# Patient Record
Sex: MALE | Race: WHITE | NOT HISPANIC OR LATINO | Employment: PART TIME | URBAN - METROPOLITAN AREA
[De-identification: names, ages, dates, MRNs, and addresses within clinical notes are randomized per-mention and may not be internally consistent; named-entity substitution may affect disease eponyms.]

---

## 2017-02-15 ENCOUNTER — HOSPITAL ENCOUNTER (OUTPATIENT)
Dept: OTHER | Facility: HOSPITAL | Age: 57
Setting detail: SPECIMEN
Discharge: HOME | End: 2017-02-15
Attending: UROLOGY | Admitting: UROLOGY

## 2017-02-15 LAB — SPECIMEN SOURCE: NORMAL

## 2021-04-22 ENCOUNTER — DOCUMENTATION (OUTPATIENT)
Dept: SURGERY | Facility: CLINIC | Age: 61
End: 2021-04-22

## 2021-04-22 NOTE — PROGRESS NOTES
5 yr cy, last done 07/20/2016.     04/23/2021, mailed colonoscopy recall letter to patient.     Thank You.

## 2021-07-22 ENCOUNTER — PREP FOR SURGERY (OUTPATIENT)
Dept: OTHER | Facility: HOSPITAL | Age: 61
End: 2021-07-22

## 2021-07-22 DIAGNOSIS — Z80.0 ENCOUNTER FOR COLONOSCOPY IN PATIENT WITH FAMILY HISTORY OF COLON CANCER: Primary | ICD-10-CM

## 2021-07-22 DIAGNOSIS — Z12.11 ENCOUNTER FOR COLONOSCOPY IN PATIENT WITH FAMILY HISTORY OF COLON CANCER: Primary | ICD-10-CM

## 2021-07-23 PROBLEM — Z80.0 ENCOUNTER FOR COLONOSCOPY IN PATIENT WITH FAMILY HISTORY OF COLON CANCER: Status: ACTIVE | Noted: 2021-07-23

## 2021-07-23 PROBLEM — Z12.11 ENCOUNTER FOR COLONOSCOPY IN PATIENT WITH FAMILY HISTORY OF COLON CANCER: Status: ACTIVE | Noted: 2021-07-23

## 2021-11-17 ENCOUNTER — ANESTHESIA EVENT (OUTPATIENT)
Dept: GASTROENTEROLOGY | Facility: HOSPITAL | Age: 61
End: 2021-11-17

## 2021-11-17 ENCOUNTER — ANESTHESIA (OUTPATIENT)
Dept: GASTROENTEROLOGY | Facility: HOSPITAL | Age: 61
End: 2021-11-17

## 2021-11-17 ENCOUNTER — HOSPITAL ENCOUNTER (OUTPATIENT)
Facility: HOSPITAL | Age: 61
Setting detail: HOSPITAL OUTPATIENT SURGERY
Discharge: HOME | End: 2021-11-17
Attending: COLON & RECTAL SURGERY | Admitting: COLON & RECTAL SURGERY

## 2021-11-17 VITALS
HEIGHT: 78 IN | RESPIRATION RATE: 16 BRPM | SYSTOLIC BLOOD PRESSURE: 135 MMHG | BODY MASS INDEX: 36.45 KG/M2 | TEMPERATURE: 98.3 F | HEART RATE: 78 BPM | WEIGHT: 315 LBS | OXYGEN SATURATION: 98 % | DIASTOLIC BLOOD PRESSURE: 86 MMHG

## 2021-11-17 LAB — GLUCOSE BLDC GLUCOMTR-MCNC: 205 MG/DL (ref 70–130)

## 2021-11-17 PROCEDURE — 25010000002 PROPOFOL 10 MG/ML EMULSION: Performed by: ANESTHESIOLOGY

## 2021-11-17 PROCEDURE — 82962 GLUCOSE BLOOD TEST: CPT

## 2021-11-17 PROCEDURE — 45378 DIAGNOSTIC COLONOSCOPY: CPT | Performed by: COLON & RECTAL SURGERY

## 2021-11-17 RX ORDER — ACETAMINOPHEN 500 MG
500 TABLET ORAL EVERY 6 HOURS PRN
COMMUNITY

## 2021-11-17 RX ORDER — SODIUM CHLORIDE, SODIUM LACTATE, POTASSIUM CHLORIDE, CALCIUM CHLORIDE 600; 310; 30; 20 MG/100ML; MG/100ML; MG/100ML; MG/100ML
INJECTION, SOLUTION INTRAVENOUS CONTINUOUS PRN
Status: DISCONTINUED | OUTPATIENT
Start: 2021-11-17 | End: 2021-11-17 | Stop reason: SURG

## 2021-11-17 RX ORDER — LIDOCAINE HYDROCHLORIDE 20 MG/ML
INJECTION, SOLUTION INFILTRATION; PERINEURAL AS NEEDED
Status: DISCONTINUED | OUTPATIENT
Start: 2021-11-17 | End: 2021-11-17 | Stop reason: SURG

## 2021-11-17 RX ORDER — SODIUM CHLORIDE, SODIUM LACTATE, POTASSIUM CHLORIDE, CALCIUM CHLORIDE 600; 310; 30; 20 MG/100ML; MG/100ML; MG/100ML; MG/100ML
30 INJECTION, SOLUTION INTRAVENOUS CONTINUOUS PRN
Status: DISCONTINUED | OUTPATIENT
Start: 2021-11-17 | End: 2021-11-17 | Stop reason: HOSPADM

## 2021-11-17 RX ORDER — PROPOFOL 10 MG/ML
VIAL (ML) INTRAVENOUS CONTINUOUS PRN
Status: DISCONTINUED | OUTPATIENT
Start: 2021-11-17 | End: 2021-11-17 | Stop reason: SURG

## 2021-11-17 RX ORDER — PROPOFOL 10 MG/ML
VIAL (ML) INTRAVENOUS AS NEEDED
Status: DISCONTINUED | OUTPATIENT
Start: 2021-11-17 | End: 2021-11-17 | Stop reason: SURG

## 2021-11-17 RX ADMIN — PROPOFOL 150 MG: 10 INJECTION, EMULSION INTRAVENOUS at 08:12

## 2021-11-17 RX ADMIN — SODIUM CHLORIDE, POTASSIUM CHLORIDE, SODIUM LACTATE AND CALCIUM CHLORIDE 30 ML/HR: 600; 310; 30; 20 INJECTION, SOLUTION INTRAVENOUS at 07:50

## 2021-11-17 RX ADMIN — SODIUM CHLORIDE, POTASSIUM CHLORIDE, SODIUM LACTATE AND CALCIUM CHLORIDE: 600; 310; 30; 20 INJECTION, SOLUTION INTRAVENOUS at 08:10

## 2021-11-17 RX ADMIN — LIDOCAINE HYDROCHLORIDE 100 MG: 20 INJECTION, SOLUTION INFILTRATION; PERINEURAL at 08:11

## 2021-11-17 RX ADMIN — Medication 100 MCG/KG/MIN: at 08:12

## 2021-11-17 NOTE — DISCHARGE INSTRUCTIONS
For the next 24 hours patient needs to be with a responsible adult.    For 24 hours DO NOT drive, operate machinery, appliances, drink alcohol, make important decisions or sign legal documents.    Start with a light or bland diet if you are feeling sick to your stomach otherwise advance to regular diet as tolerated.    Follow recommendations on procedure report if provided by your doctor.    Call Dr Bradford for problems [phone]    Problems may include but not limited to: large amounts of bleeding, trouble breathing, repeated vomiting, severe unrelieved pain, fever or chills.

## 2021-11-17 NOTE — H&P
Zak Pérez is a 61 y.o. male  who is referred by Brenton Bradford MD for a colonoscopy. He   has an indications: family history of colon polyps.     He denies any change in bowel function, melena, or hematochezia.    Past Medical History:   Diagnosis Date   • Atrial fibrillation (HCC)    • Diabetes mellitus (HCC)    • Diverticula, colon 07/20/2016    Colonoscopy; Few small & large-mouthed diverticula in sigmoid colon, Dr. Brenton Bradford at [facility].   • Hypertension        Past Surgical History:   Procedure Laterality Date   • CARDIOVERSION  2017   • COLONOSCOPY N/A 7/20/2016    Few small & large-mouthed diverticula in sigmoid colon, rescope 5 years, Dr. Brenton Bradford at [facility].  Surgeon: Brenton Bradford MD;  Location: [facility] ENDOSCOPY;  Service:    • FOOT SURGERY     • FOOT SURGERY Right        Medications Prior to Admission   Medication Sig Dispense Refill Last Dose   • acetaminophen (TYLENOL) 500 MG tablet Take 500 mg by mouth Every 6 (Six) Hours As Needed for Mild Pain .   11/16/2021 at Unknown time   • apixaban (ELIQUIS) 5 MG tablet tablet Take 5 mg by mouth 2 (Two) Times a Day.   11/10/2021   • flecainide (TAMBOCOR) 150 MG tablet Take 150 mg by mouth 2 (two) times a day.   11/17/2021 at Unknown time   • losartan-hydrochlorothiazide (HYZAAR) 100-25 MG per tablet Take 1 tablet by mouth daily.   11/16/2021 at Unknown time   • metFORMIN (GLUCOPHAGE) 1000 MG tablet Take 1,000 mg by mouth 2 (two) times a day with meals.   11/16/2021 at Unknown time   • metoprolol tartrate (LOPRESSOR) 50 MG tablet Take 50 mg by mouth 2 (two) times a day.   11/17/2021 at Unknown time   • potassium chloride (K-DUR) 10 MEQ CR tablet Take 10 mEq by mouth 2 (two) times a day.   11/16/2021 at Unknown time       No Known Allergies    Family History   Problem Relation Age of Onset   • Colon polyps Mother    • Diabetes Mother    • Colon polyps Father    • Cancer Father         COLON   • Cancer Brother         COLON   • Colon polyps Brother    •  Diabetes Brother    • Malig Hyperthermia Neg Hx        Social History     Socioeconomic History   • Marital status: Single   Tobacco Use   • Smoking status: Former Smoker   • Smokeless tobacco: Never Used   Vaping Use   • Vaping Use: Never used   Substance and Sexual Activity   • Alcohol use: Yes     Comment: RARELY   • Drug use: No   • Sexual activity: Defer       Review of Systems   Gastrointestinal: Negative for abdominal pain, nausea and vomiting.   All other systems reviewed and are negative.      Vitals:    11/17/21 0745   BP: 148/83   Pulse: 83   Resp: 16   Temp: 98.3 °F (36.8 °C)   SpO2: 97%         Physical Exam  Constitutional:       Appearance: He is well-developed.   HENT:      Head: Normocephalic and atraumatic.   Eyes:      Pupils: Pupils are equal, round, and reactive to light.   Cardiovascular:      Rate and Rhythm: Regular rhythm.   Pulmonary:      Effort: Pulmonary effort is normal.   Abdominal:      General: There is no distension.      Palpations: Abdomen is soft.   Musculoskeletal:         General: Normal range of motion.   Skin:     General: Skin is warm and dry.   Neurological:      Mental Status: He is alert and oriented to person, place, and time.   Psychiatric:         Thought Content: Thought content normal.         Judgment: Judgment normal.           Assessment/Plan      indications: family history of colon polyps         I recommend colonoscopy.  I described risks, benefits of the procedure with the patient including but not limited to bleeding, infection, possibility of perforation and possible polypectomy. All of the patient's questions were answered and they would like to proceed with the above recommendations.

## 2021-11-17 NOTE — ANESTHESIA POSTPROCEDURE EVALUATION
"Patient: Zak Pérez    Procedure Summary     Date: 11/17/21 Room / Location: [facility] ENDOSCOPY 9 / [facility] ENDOSCOPY    Anesthesia Start: 0809 Anesthesia Stop: 0841    Procedure: COLONOSCOPY to cecum (N/A ) Diagnosis:       Encounter for colonoscopy in patient with family history of colon cancer      (Encounter for colonoscopy in patient with family history of colon cancer [Z12.11, Z80.0])    Surgeons: Brenton Bradford MD Provider: Salvatore Rodriguez MD    Anesthesia Type: MAC ASA Status: 3          Anesthesia Type: MAC    Vitals  Vitals Value Taken Time   /86 11/17/21 0900   Temp     Pulse 78 11/17/21 0900   Resp 16 11/17/21 0900   SpO2 98 % 11/17/21 0900           Post Anesthesia Care and Evaluation    Patient location during evaluation: bedside  Patient participation: complete - patient participated  Level of consciousness: sleepy but conscious  Pain score: 0  Pain management: adequate  Airway patency: patent  Anesthetic complications: No anesthetic complications    Cardiovascular status: acceptable  Respiratory status: acceptable  Hydration status: acceptable    Comments: /86   Pulse 78   Temp 36.8 °C (98.3 °F) (Oral)   Resp 16   Ht 198.1 cm (78\")   Wt (!) 161 kg (354 lb)   SpO2 98%   BMI 40.91 kg/m²         "

## 2021-11-17 NOTE — ANESTHESIA PREPROCEDURE EVALUATION
Anesthesia Evaluation     Patient summary reviewed and Nursing notes reviewed   NPO Solid Status: > 8 hours  NPO Liquid Status: > 8 hours           Airway   Mallampati: II  Neck ROM: full  No difficulty expected  Dental - normal exam     Pulmonary     breath sounds clear to auscultation  (+) a smoker Former,   Cardiovascular     Rhythm: regular    (+) hypertension, dysrhythmias Atrial Fib,       Neuro/Psych  GI/Hepatic/Renal/Endo    (+) obesity (350 lbs), morbid obesity,  diabetes mellitus,     Musculoskeletal     Abdominal   (+) obese,    Substance History      OB/GYN          Other                        Anesthesia Plan    ASA 3     MAC     intravenous induction     Anesthetic plan, all risks, benefits, and alternatives have been provided, discussed and informed consent has been obtained with: patient.

## 2021-11-23 ENCOUNTER — IMMUNIZATION (OUTPATIENT)
Dept: VACCINE CLINIC | Facility: HOSPITAL | Age: 61
End: 2021-11-23

## 2021-11-23 PROCEDURE — 91300 HC SARSCOV02 VAC 30MCG/0.3ML IM: CPT | Performed by: INTERNAL MEDICINE

## 2021-11-23 PROCEDURE — 0004A ADM SARSCOV2 30MCG/0.3ML BOOSTER: CPT | Performed by: INTERNAL MEDICINE

## (undated) DEVICE — TUBING, SUCTION, 1/4" X 10', STRAIGHT: Brand: MEDLINE

## (undated) DEVICE — KT ORCA ORCAPOD DISP STRL

## (undated) DEVICE — ADAPT CLN BIOGUARD AIR/H2O DISP

## (undated) DEVICE — CANN O2 ETCO2 FITS ALL CONN CO2 SMPL A/ 7IN DISP LF

## (undated) DEVICE — GOWN SURG AERO CHROME XL

## (undated) DEVICE — SENSR O2 OXIMAX FNGR A/ 18IN NONSTR

## (undated) DEVICE — LN SMPL CO2 SHTRM SD STREAM W/M LUER